# Patient Record
(demographics unavailable — no encounter records)

---

## 2025-03-25 NOTE — HISTORY OF PRESENT ILLNESS
[TextBox_4] : NEW PATIENT PRESENT FOR AMENORRHEA [Mammogramdate] : -0- [BreastSonogramDate] : -0- [BoneDensityDate] : -0- [ColonoscopyDate] : -0- [LMPDate] : 12/31/24 [PGxTotal] : 1 [TextBox_6] : 12/31/24 [FreeTextEntry1] : 12/31/24